# Patient Record
Sex: MALE | NOT HISPANIC OR LATINO | Employment: FULL TIME | URBAN - NONMETROPOLITAN AREA
[De-identification: names, ages, dates, MRNs, and addresses within clinical notes are randomized per-mention and may not be internally consistent; named-entity substitution may affect disease eponyms.]

---

## 2018-01-31 ENCOUNTER — DOCUMENTATION ONLY (OUTPATIENT)
Dept: FAMILY MEDICINE | Facility: OTHER | Age: 53
End: 2018-01-31

## 2018-01-31 PROBLEM — Z87.448 PERSONAL HISTORY OF OTHER DISORDER OF URINARY SYSTEM: Status: ACTIVE | Noted: 2018-01-31

## 2018-01-31 PROBLEM — B35.1 ONYCHOMYCOSIS: Status: ACTIVE | Noted: 2018-01-31

## 2020-02-05 ENCOUNTER — TRANSFERRED RECORDS (OUTPATIENT)
Dept: HEALTH INFORMATION MANAGEMENT | Facility: OTHER | Age: 55
End: 2020-02-05

## 2021-11-12 ENCOUNTER — HOSPITAL ENCOUNTER (OUTPATIENT)
Dept: GENERAL RADIOLOGY | Facility: OTHER | Age: 56
End: 2021-11-12
Attending: PHYSICIAN ASSISTANT
Payer: COMMERCIAL

## 2021-11-12 ENCOUNTER — OFFICE VISIT (OUTPATIENT)
Dept: FAMILY MEDICINE | Facility: OTHER | Age: 56
End: 2021-11-12
Attending: PHYSICIAN ASSISTANT
Payer: COMMERCIAL

## 2021-11-12 VITALS
OXYGEN SATURATION: 98 % | RESPIRATION RATE: 16 BRPM | SYSTOLIC BLOOD PRESSURE: 142 MMHG | HEIGHT: 70 IN | DIASTOLIC BLOOD PRESSURE: 86 MMHG | WEIGHT: 205.4 LBS | TEMPERATURE: 97.8 F | HEART RATE: 86 BPM | BODY MASS INDEX: 29.41 KG/M2

## 2021-11-12 DIAGNOSIS — S63.635A SPRAIN OF INTERPHALANGEAL JOINT OF LEFT RING FINGER, INITIAL ENCOUNTER: ICD-10-CM

## 2021-11-12 DIAGNOSIS — M79.645 PAIN OF FINGER OF LEFT HAND: Primary | ICD-10-CM

## 2021-11-12 PROCEDURE — 99203 OFFICE O/P NEW LOW 30 MIN: CPT | Performed by: PHYSICIAN ASSISTANT

## 2021-11-12 PROCEDURE — 73130 X-RAY EXAM OF HAND: CPT | Mod: LT

## 2021-11-12 ASSESSMENT — PAIN SCALES - GENERAL: PAINLEVEL: NO PAIN (0)

## 2021-11-12 ASSESSMENT — MIFFLIN-ST. JEOR: SCORE: 1760
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
## 2021-11-12 NOTE — PATIENT INSTRUCTIONS
Please refer to your AVS for follow up and pain/symptoms management recommendations (I.e.: medications, helpful conservative treatment modalities, appropriate follow up if need to a specialist or family practice, etc.). Please return to urgent care if your symptoms change or worsen.     Discharge instructions:  -If you were prescribed a medication(s), please take this as prescribed/directed  -Monitor your symptoms, if changing/worsening, return to UC/ER or PCP for follow up    For pain control - we recommend alternating Tylenol and Ibuprofen if you are able to take these medications. Alternate every 4 hours as needed. I.e.: Ibuprofen at 8am, Tylenol 12pm, Ibuprofen 4pm    -Daily maximum of Tylenol is 4000mg (recommend staying under 3000mg)   -Daily maximum of Ibuprofen is 1200mg (take no more than six 200mg pills a day)    Buddy tape fingers

## 2021-11-12 NOTE — NURSING NOTE
"Chief Complaint   Patient presents with     Musculoskeletal Problem     left ring finger     Patient is here for an injury to his left ring finger that happened around 10:30AM. Patient states he dropped a plate from his wood stove onto it and now it will not straighten out. Patient does not have any cuts to the finger.     Initial BP (!) 146/100   Pulse 86   Temp 97.8  F (36.6  C) (Tympanic)   Resp 16   Ht 1.765 m (5' 9.5\")   Wt 93.2 kg (205 lb 6.4 oz)   SpO2 98%   BMI 29.90 kg/m   Estimated body mass index is 29.9 kg/m  as calculated from the following:    Height as of this encounter: 1.765 m (5' 9.5\").    Weight as of this encounter: 93.2 kg (205 lb 6.4 oz).  Medication Reconciliation: complete    Evelia Middleton LPN  "

## 2021-12-13 ENCOUNTER — OFFICE VISIT (OUTPATIENT)
Dept: FAMILY MEDICINE | Facility: OTHER | Age: 56
End: 2021-12-13
Attending: FAMILY MEDICINE
Payer: COMMERCIAL

## 2021-12-13 VITALS
DIASTOLIC BLOOD PRESSURE: 100 MMHG | WEIGHT: 206.6 LBS | TEMPERATURE: 96.6 F | BODY MASS INDEX: 30.07 KG/M2 | SYSTOLIC BLOOD PRESSURE: 154 MMHG | OXYGEN SATURATION: 95 % | RESPIRATION RATE: 20 BRPM | HEART RATE: 83 BPM

## 2021-12-13 DIAGNOSIS — M20.012 MALLET FINGER OF LEFT HAND: ICD-10-CM

## 2021-12-13 PROCEDURE — 99213 OFFICE O/P EST LOW 20 MIN: CPT | Performed by: FAMILY MEDICINE

## 2021-12-13 ASSESSMENT — PAIN SCALES - GENERAL: PAINLEVEL: NO PAIN (0)

## 2021-12-13 NOTE — PROGRESS NOTES
SUBJECTIVE:   Eren Cheney is a 56 year old male who presents to clinic today for the following health issues: Referral for finger    Patient arrives here for a referral for his finger.  He was recently seen and diagnosed with a sprain.  He reports that the portion of the chimney h. is not able to extend it.  He was placed in a splint but stop the splint about a week ago        Patient Active Problem List    Diagnosis Date Noted     Mallet finger of left hand 12/13/2021     Priority: Medium     Personal history of other disorder of urinary system 01/31/2018     Priority: Medium     Onychomycosis 01/31/2018     Priority: Medium     Elevated blood pressure reading without diagnosis of hypertension 03/13/2012     Priority: Medium     Hemorrhoids, external 03/13/2012     Priority: Medium       Review of Systems     OBJECTIVE:     BP (!) 154/100   Pulse 83   Temp (!) 96.6  F (35.9  C)   Resp 20   Wt 93.7 kg (206 lb 9.6 oz)   SpO2 95%   BMI 30.07 kg/m    Body mass index is 30.07 kg/m .  Physical Exam  Musculoskeletal:      Comments: Patient unable to extend his left fourth digit DIP joint   Neurological:      Mental Status: He is alert.         Diagnostic Test Results:  none     ASSESSMENT/PLAN:         (M20.012) Mallet finger of left hand  Discussed plan of care.  Keep it splinted for at least 6 to 8 weeks.  Reviewed recommendations.  Follow-up in a couple weeks for recheck.  Patient is advised long-term he might not get the function back.  Referral offered but patient is satisfied with just proceeding with splinting x-rays reviewed not showing any chip fracture      Thang Mancini MD  [facility] AND [facility]

## 2021-12-13 NOTE — NURSING NOTE
Patient here for referral to ortho for left ring finger injury November 11, 2021. Medication Reconciliation: complete.    Socorro Virgen LPN  12/13/2021 11:21 AM

## 2021-12-13 NOTE — LETTER
December 13, 2021      Eren Cheney  [id number]  [place] [address]        Dear ,    I noticed after you left your blood pressure was elevated.  I would recommend getting blood pressure checks outpatient for a couple of weeks.  And if it is still l elevated would recommend a follow-up appointment here in clinic to discuss blood pressure meds.  If you have any questions or concerns, please call the clinic at the number listed above.       Sincerely,  Thang Mancini MD on 12/13/2021 at 12:44 PM

## 2022-08-29 ENCOUNTER — OFFICE VISIT (OUTPATIENT)
Dept: FAMILY MEDICINE | Facility: OTHER | Age: 57
End: 2022-08-29
Attending: FAMILY MEDICINE
Payer: COMMERCIAL

## 2022-08-29 ENCOUNTER — HOSPITAL ENCOUNTER (OUTPATIENT)
Dept: GENERAL RADIOLOGY | Facility: OTHER | Age: 57
Discharge: HOME | End: 2022-08-29
Attending: FAMILY MEDICINE
Payer: COMMERCIAL

## 2022-08-29 VITALS
SYSTOLIC BLOOD PRESSURE: 136 MMHG | HEIGHT: 68 IN | HEART RATE: 81 BPM | DIASTOLIC BLOOD PRESSURE: 110 MMHG | WEIGHT: 203.2 LBS | OXYGEN SATURATION: 99 % | RESPIRATION RATE: 16 BRPM | BODY MASS INDEX: 30.8 KG/M2 | TEMPERATURE: 97.9 F

## 2022-08-29 DIAGNOSIS — M54.41 ACUTE RIGHT-SIDED LOW BACK PAIN WITH RIGHT-SIDED SCIATICA: ICD-10-CM

## 2022-08-29 DIAGNOSIS — M54.41 ACUTE RIGHT-SIDED LOW BACK PAIN WITH RIGHT-SIDED SCIATICA: Primary | ICD-10-CM

## 2022-08-29 PROCEDURE — 99213 OFFICE O/P EST LOW 20 MIN: CPT | Performed by: FAMILY MEDICINE

## 2022-08-29 PROCEDURE — 72100 X-RAY EXAM L-S SPINE 2/3 VWS: CPT

## 2022-08-29 RX ORDER — METHYLPREDNISOLONE 4 MG
TABLET, DOSE PACK ORAL
Qty: 21 TABLET | Refills: 0 | Status: SHIPPED | OUTPATIENT
Start: 2022-08-29

## 2022-08-29 RX ORDER — CYCLOBENZAPRINE HCL 10 MG
10 TABLET ORAL 3 TIMES DAILY PRN
Qty: 30 TABLET | Refills: 0 | Status: SHIPPED | OUTPATIENT
Start: 2022-08-29

## 2022-08-29 ASSESSMENT — PAIN SCALES - GENERAL: PAINLEVEL: EXTREME PAIN (8)

## 2022-08-29 ASSESSMENT — ENCOUNTER SYMPTOMS
FEVER: 0
WEAKNESS: 0
CHILLS: 0

## 2022-08-29 NOTE — NURSING NOTE
"Chief Complaint   Patient presents with     Pain     sciatica         Initial BP (!) 136/110   Pulse 81   Temp 97.9  F (36.6  C) (Tympanic)   Resp 16   Ht 1.734 m (5' 8.25\")   Wt 92.2 kg (203 lb 3.2 oz)   SpO2 99%   BMI 30.67 kg/m   Estimated body mass index is 30.67 kg/m  as calculated from the following:    Height as of this encounter: 1.734 m (5' 8.25\").    Weight as of this encounter: 92.2 kg (203 lb 3.2 oz).         Norma J. Gosselin, TARA   "

## 2022-08-29 NOTE — PROGRESS NOTES
Assessment & Plan     Acute right-sided low back pain with right-sided sciatica  XR with mild degenerative changes of the lower lumbar spine and SI joint.  He has no red flag symptoms.  Start Medrol dose pack and trial muscle relaxer.  He may continue Ibuprofen and Tylenol as needed.  Referral to physical therapy for further evaluation and management.  Follow-up in clinic if no improvement in his symptoms or any new/worsening symptoms.  Consider further evaluation with MRI at that time.  - XR Lumbar Spine 2/3 Views; Future  - methylPREDNISolone (MEDROL DOSEPAK) 4 MG tablet therapy pack; Follow Package Directions  - cyclobenzaprine (FLEXERIL) 10 MG tablet; Take 1 tablet (10 mg) by mouth 3 times daily as needed for muscle spasms    Brooke Olsen, [facility] AND [facility]    Umang Jason is a 57 year old, presenting for the following health issues:  Pain (sciatica)      Pain  Pertinent negatives include no chills, fever or weakness.   History of Present Illness       Back Pain:  He presents for follow up of back pain. Patient's back pain is a new problem.    Original cause of back pain: not sure  First noticed back pain: in the last week  Patient feels back pain: rarelyLocation of back pain:  Right lower back, right buttock, right hip and right side of waist  Description of back pain: dull ache, sharp, shooting and stabbing  Back pain spreads: right thigh    Since patient first noticed back pain, pain is: unchanged  Does back pain interfere with his job:  Yes  On a scale of 1-10 (10 being the worst), patient describes pain as:  8  What makes back pain worse: certain positions, lying down, sitting, standing and twisting  Acupuncture: not tried  Acetaminophen: not tried  Activity or exercise: not helpful  Chiropractor:  Not tried  Cold: not tried  Heat: not tried  Massage: not tried  Muscle relaxants: not tried  NSAIDS: helpful  Opioids: not tried  Physical Therapy: not tried  Rest: not  "helpful  Steroid Injection: not tried  Stretching: not helpful  Surgery: not tried  TENS unit: not tried  Topical pain relievers: not tried  Other healthcare providers patient is seeing for back pain: None    He eats 2-3 servings of fruits and vegetables daily.He consumes 0 sweetened beverage(s) daily.He exercises with enough effort to increase his heart rate 30 to 60 minutes per day.    He is taking medications regularly.     His symptoms started on Saturday.  He was picking blueberries for four hours earlier in the week.  He had some back soreness after this but cannot think of any other inciting event or injury.  He reports pain starting in his right lower back with radiation to his right buttocks and down the back of his right leg.  He has associated numbness along the shin of his right lower leg.  He describes the pain as \"sharp\" and \"shooting.\"  Pain is constant.  He rates the severity as ranging from 4-8/10.  He has no associated lower extremity weakness.  He reports pain with standing or prolonged sitting or lying. No loss of control of bladder or bowels.  No saddle paresthesia.  He has tried taking Ibuprofen with some improvement in his pain.     He had similar symptoms years ago on the left, though he did not have any numbness with that episode.    Review of Systems   Constitutional: Negative for chills and fever.   Neurological: Negative for weakness.          Objective    BP (!) 136/110   Pulse 81   Temp 97.9  F (36.6  C) (Tympanic)   Resp 16   Ht 1.734 m (5' 8.25\")   Wt 92.2 kg (203 lb 3.2 oz)   SpO2 99%   BMI 30.67 kg/m    Body mass index is 30.67 kg/m .  Physical Exam  Constitutional:       General: He is not in acute distress.     Appearance: Normal appearance. He is not ill-appearing.   Pulmonary:      Effort: Pulmonary effort is normal.   Musculoskeletal:      Comments: No midline tenderness of lumbar spine. Tenderness to palpation right of spine at approximate level of L5. No tenderness to " palpation of right piriformis muscle or right hip joint.   Neurological:      Mental Status: He is alert.      Comments: Lower extremity strength 5/5 bilaterally. Toe and heel walk intact. He did not tolerate straight leg raise test. Modified straight leg raise test while sitting was negative.     XR Lumbar Spine 2/3 Views    Result Date: 8/29/2022  XR LUMBAR SPINE 2/3 VIEWS HISTORY: Acute right-sided low back pain with right-sided sciatica . COMPARISON: CT 9/13/2016. TECHNIQUE: 3 views of lumbosacral spine. FINDINGS: There is mildly diminished vertebral body height at T12, chronic and unchanged. No acute fracture is identified. The lumbar lordosis is preserved. Disc heights are relatively preserved. Facet hypertrophy is minimal. There is mild SI joint degeneration.      IMPRESSION: Mild degenerative changes of the lumbar spine.  MELANIE THOMSON MD   SYSTEM ID:  [id number]